# Patient Record
Sex: MALE
[De-identification: names, ages, dates, MRNs, and addresses within clinical notes are randomized per-mention and may not be internally consistent; named-entity substitution may affect disease eponyms.]

---

## 2021-02-23 ENCOUNTER — APPOINTMENT (OUTPATIENT)
Dept: OTOLARYNGOLOGY | Facility: CLINIC | Age: 26
End: 2021-02-23
Payer: COMMERCIAL

## 2021-02-23 DIAGNOSIS — Z78.9 OTHER SPECIFIED HEALTH STATUS: ICD-10-CM

## 2021-02-23 DIAGNOSIS — Z80.6 FAMILY HISTORY OF LEUKEMIA: ICD-10-CM

## 2021-02-23 PROCEDURE — 99204 OFFICE O/P NEW MOD 45 MIN: CPT

## 2021-02-23 PROCEDURE — 99072 ADDL SUPL MATRL&STAF TM PHE: CPT

## 2021-02-23 NOTE — HISTORY OF PRESENT ILLNESS
[de-identified] : pt is refer by Merced Tom (NP) for lip lesion. pt notices the lesion 2 months ago. the lesion gets bigger and smaller and sometime popped. pt does not remember any trauma to the area. no pain, no bleeding. pt states bother him when he eats. no dysphagia, no dyspnea\par non smoker or drug use.

## 2021-02-23 NOTE — REASON FOR VISIT
Patient and her mother seen for a [place] for PPD reading with assistance from Donavan Carvajal, . The PPD is NEG, zero mm, and the TB test sheet and TB section of p. 4 of the school physical were completed. A copy of p. 4 of the school physical was made for the chart and all original paperwork was returned to the patient's mother. Amandeep Lundberg RN [Initial Evaluation] : an initial evaluation for [FreeTextEntry2] : lip lesion

## 2021-02-23 NOTE — PHYSICAL EXAM
[FreeTextEntry1] : Likely mucocele along the right lower lip, no TTP, compressible.  No other lesions in the OC/OPx. [Normal] : no rashes

## 2021-02-23 NOTE — CONSULT LETTER
[Dear  ___] : Dear ~DI, [Consult Letter:] : I had the pleasure of evaluating your patient, [unfilled]. [Please see my note below.] : Please see my note below. [Consult Closing:] : Thank you very much for allowing me to participate in the care of this patient.  If you have any questions, please do not hesitate to contact me. [Sincerely,] : Sincerely, [FreeTextEntry2] : Ms. Merced Spaulding, VENANCIO\par [address], \[place] [address] [FreeTextEntry3] : Dev\par \par Jassi Avelar MD FACS\par Division of Head and Neck Surgery\par Department of Otolaryngology \par [facility]\par \par  of Otolaryngology\par Assistant Professor of Surgery\par [facility] [employer] at [facility]

## 2021-03-15 ENCOUNTER — APPOINTMENT (OUTPATIENT)
Dept: OTOLARYNGOLOGY | Facility: CLINIC | Age: 26
End: 2021-03-15

## 2021-04-19 ENCOUNTER — LABORATORY RESULT (OUTPATIENT)
Age: 26
End: 2021-04-19

## 2021-04-19 ENCOUNTER — APPOINTMENT (OUTPATIENT)
Dept: OTOLARYNGOLOGY | Facility: CLINIC | Age: 26
End: 2021-04-19
Payer: MEDICARE

## 2021-04-19 PROCEDURE — 99072 ADDL SUPL MATRL&STAF TM PHE: CPT

## 2021-04-19 PROCEDURE — 40510 PARTIAL EXCISION OF LIP: CPT

## 2021-04-19 NOTE — PHYSICAL EXAM
[Normal] : no rashes [FreeTextEntry1] : Likely mucocele along the right lower lip, no TTP, compressible.  No other lesions in the OC/OPx.

## 2021-04-19 NOTE — HISTORY OF PRESENT ILLNESS
[de-identified] : pt is refer by Merced Tom (NP) for lip lesion and pt is here today for excisional biopsy of the mucocele.  no dysphagia, no dyspnea\par non smoker or drug use.

## 2021-04-19 NOTE — PROCEDURE
[FreeTextEntry1] : Excision of lower lip mucocele [FreeTextEntry2] : Lower lip mucocele [FreeTextEntry3] : After patient gave consent to proceed, the area of concern was anesthetized with 1% Lidocaine with 1:100 K epinephrine.  A transverse mucosal incision was marked in an elliptical fashion around the mucocele.  This was made and taken down to the level of the minor salivary glands which were excised around the mucocele with the lesion of concern.  The specimen was sent to Pathology.  Hemostasis was confirmed.  The wound was repaired with sutures of 4-0 chromic.  Patient tolerated the procedure well.

## 2021-04-19 NOTE — CONSULT LETTER
[Dear  ___] : Dear ~DI, [Courtesy Letter:] : I had the pleasure of seeing your patient, [unfilled], in my office today. [Please see my note below.] : Please see my note below. [Consult Closing:] : Thank you very much for allowing me to participate in the care of this patient.  If you have any questions, please do not hesitate to contact me. [FreeTextEntry2] : Ms. Merced Spaulding, VENANCIO\par [address], \[place] [address]  [Sincerely,] : Sincerely, [FreeTextEntry3] : Dev\par \par Jassi Avelar MD FACS\par Division of Head and Neck Surgery\par Department of Otolaryngology \par [facility]\par \par  of Otolaryngology\par Assistant Professor of Surgery\par [facility] [employer] at [facility]

## 2021-05-04 ENCOUNTER — APPOINTMENT (OUTPATIENT)
Dept: OTOLARYNGOLOGY | Facility: CLINIC | Age: 26
End: 2021-05-04
Payer: MEDICARE

## 2021-05-04 PROCEDURE — 99024 POSTOP FOLLOW-UP VISIT: CPT

## 2021-05-04 NOTE — PHYSICAL EXAM
[FreeTextEntry1] : Small blister at site of excision, no bleeding, soft, no TTP.  No other lesions. [Normal] : no rashes

## 2021-05-04 NOTE — HISTORY OF PRESENT ILLNESS
[de-identified] : pt of  Merced Tom (NP) and is here for post excisional biopsy of the mucocele on 4/19/2021, path c/w squamous mucosa. pt has no pain but notice a small lump at the site of excision one week after the procedure.   no pain,  no dysphagia, no bleeding. \par non smoker or drug use.

## 2021-06-01 ENCOUNTER — APPOINTMENT (OUTPATIENT)
Dept: OTOLARYNGOLOGY | Facility: CLINIC | Age: 26
End: 2021-06-01
Payer: MEDICARE

## 2021-06-01 ENCOUNTER — RESULT REVIEW (OUTPATIENT)
Age: 26
End: 2021-06-01

## 2021-06-01 PROCEDURE — 99024 POSTOP FOLLOW-UP VISIT: CPT

## 2021-06-01 PROCEDURE — 40510 PARTIAL EXCISION OF LIP: CPT | Mod: 78

## 2021-06-01 NOTE — HISTORY OF PRESENT ILLNESS
[de-identified] : pt of  Merced Tom (NP) and is here for post excisional biopsy of the mucocele on 4/19/2021, path c/w squamous mucosa. pt the small lump at the site of excision is getting bigger like prior excision and then get smaller after oozing out some fluid. now still there, + pain and irritate when eating.  no dysphagia, no bleeding. \par non smoker or drug use.

## 2021-06-01 NOTE — PROCEDURE
[FreeTextEntry1] : Excision of recurrent lower lip mucocele.  [FreeTextEntry2] : Lower lip mucocele.  [FreeTextEntry3] : After patient gave consent to proceed, the area of concern was anesthetized with 1% Lidocaine with 1:100 K epinephrine. A transverse mucosal incision was marked in an elliptical fashion around the mucocele. This was made and taken down to the level of the minor salivary glands which were excised around the mucocele with the lesion of concern down to the muscle. The specimen was sent to Pathology. Hemostasis was confirmed. The wound was repaired with sutures of 4-0 chromic. Patient tolerated the procedure well.

## 2021-06-01 NOTE — PHYSICAL EXAM
[FreeTextEntry1] : There appears to be recurrent mucocele along the site of previous excision, no bleeding, soft, no TTP.  No other lesions. [Normal] : no rashes

## 2021-06-15 ENCOUNTER — APPOINTMENT (OUTPATIENT)
Dept: OTOLARYNGOLOGY | Facility: CLINIC | Age: 26
End: 2021-06-15
Payer: MEDICARE

## 2021-06-15 PROCEDURE — 99024 POSTOP FOLLOW-UP VISIT: CPT

## 2021-06-15 RX ORDER — MODAFINIL 100 MG/1
100 TABLET ORAL
Qty: 30 | Refills: 0 | Status: COMPLETED | COMMUNITY
Start: 2021-01-22 | End: 2021-06-15

## 2021-09-30 NOTE — PHYSICAL EXAM
[FreeTextEntry1] : The lip appears to be healing well with some postprocedural changes.  No new lesions. [Normal] : no rashes

## 2021-09-30 NOTE — HISTORY OF PRESENT ILLNESS
[de-identified] : 25 year old male pt of  Merced Tom (NP) and is here for post excisional biopsy of the mucocele on 4/19/2021, path c/w squamous mucosa.  pt is here to f/u post 2nd time excisional of recurrent mucocele  on 6/1/2021. and path c/w mucocele. Reports still has small lump at excision site. Denies pain, bleeding.\par \par non smoker or drug use.

## 2021-11-09 ENCOUNTER — APPOINTMENT (OUTPATIENT)
Dept: OTOLARYNGOLOGY | Facility: CLINIC | Age: 26
End: 2021-11-09
Payer: MEDICARE

## 2021-11-09 VITALS
HEART RATE: 85 BPM | SYSTOLIC BLOOD PRESSURE: 117 MMHG | DIASTOLIC BLOOD PRESSURE: 76 MMHG | HEIGHT: 72 IN | BODY MASS INDEX: 33.86 KG/M2 | WEIGHT: 250 LBS

## 2021-11-09 DIAGNOSIS — K13.0 DISEASES OF LIPS: ICD-10-CM

## 2021-11-09 PROCEDURE — 99214 OFFICE O/P EST MOD 30 MIN: CPT

## 2021-11-09 RX ORDER — CHLORHEXIDINE GLUCONATE, 0.12% ORAL RINSE 1.2 MG/ML
0.12 SOLUTION DENTAL
Qty: 1 | Refills: 1 | Status: COMPLETED | COMMUNITY
Start: 2021-04-19 | End: 2021-11-09

## 2021-11-09 RX ORDER — OXYCODONE AND ACETAMINOPHEN 5; 325 MG/1; MG/1
5-325 TABLET ORAL
Qty: 12 | Refills: 0 | Status: COMPLETED | COMMUNITY
Start: 2021-06-01 | End: 2021-11-09

## 2021-11-09 RX ORDER — CHLORHEXIDINE GLUCONATE, 0.12% ORAL RINSE 1.2 MG/ML
0.12 SOLUTION DENTAL
Qty: 1 | Refills: 1 | Status: COMPLETED | COMMUNITY
Start: 2021-06-01 | End: 2021-11-09

## 2021-11-09 RX ORDER — CLOTRIMAZOLE 10 MG/ML
1 SOLUTION TOPICAL
Qty: 30 | Refills: 0 | Status: COMPLETED | COMMUNITY
Start: 2021-03-17 | End: 2021-11-09

## 2021-11-09 RX ORDER — MODAFINIL 200 MG/1
200 TABLET ORAL
Qty: 30 | Refills: 0 | Status: COMPLETED | COMMUNITY
Start: 2021-05-19 | End: 2021-11-09

## 2021-11-09 NOTE — HISTORY OF PRESENT ILLNESS
[de-identified] : 26 year old male pt of Merced Tom (NP) and is here for post excisional biopsy of the mucocele on 4/19/2021, path c/w squamous mucosa. pt is here to f/u post 2nd time excisional of recurrent mucocele on 6/1/2021, and path c/w mucocele. Patient reports small lump at excision site and numbness to bottom lip. Denies pain, bleeding, dysphagia, odynophagia, aspirations, dyspnea, dysphonia, otalgia.

## 2021-11-09 NOTE — PHYSICAL EXAM
[Normal] : no rashes [FreeTextEntry1] : The lip is well healed with a small palpable linear scar.  No evidence of recurrent lesions.  No new lesions.

## 2024-02-13 ENCOUNTER — APPOINTMENT (OUTPATIENT)
Dept: ORTHOPEDIC SURGERY | Facility: CLINIC | Age: 29
End: 2024-02-13